# Patient Record
Sex: MALE | Race: BLACK OR AFRICAN AMERICAN | NOT HISPANIC OR LATINO | Employment: FULL TIME | URBAN - METROPOLITAN AREA
[De-identification: names, ages, dates, MRNs, and addresses within clinical notes are randomized per-mention and may not be internally consistent; named-entity substitution may affect disease eponyms.]

---

## 2024-07-02 ENCOUNTER — HOSPITAL ENCOUNTER (EMERGENCY)
Facility: CLINIC | Age: 44
Discharge: HOME | End: 2024-07-02
Attending: EMERGENCY MEDICINE | Admitting: EMERGENCY MEDICINE
Payer: COMMERCIAL

## 2024-07-02 VITALS
TEMPERATURE: 98.1 F | OXYGEN SATURATION: 94 % | RESPIRATION RATE: 17 BRPM | SYSTOLIC BLOOD PRESSURE: 163 MMHG | HEIGHT: 67 IN | HEART RATE: 79 BPM | DIASTOLIC BLOOD PRESSURE: 123 MMHG | BODY MASS INDEX: 29 KG/M2 | WEIGHT: 184.75 LBS

## 2024-07-02 DIAGNOSIS — I10 HYPERTENSION, UNSPECIFIED TYPE: ICD-10-CM

## 2024-07-02 DIAGNOSIS — D58.2 ELEVATED HEMOGLOBIN (H): ICD-10-CM

## 2024-07-02 LAB
ANION GAP SERPL CALCULATED.3IONS-SCNC: 12 MMOL/L (ref 7–15)
ATRIAL RATE - MUSE: 77 BPM
BASOPHILS # BLD AUTO: 0.1 10E3/UL (ref 0–0.2)
BASOPHILS NFR BLD AUTO: 1 %
BUN SERPL-MCNC: 14.9 MG/DL (ref 6–20)
CALCIUM SERPL-MCNC: 9.8 MG/DL (ref 8.6–10)
CHLORIDE SERPL-SCNC: 105 MMOL/L (ref 98–107)
CREAT SERPL-MCNC: 1.17 MG/DL (ref 0.67–1.17)
DEPRECATED HCO3 PLAS-SCNC: 21 MMOL/L (ref 22–29)
DIASTOLIC BLOOD PRESSURE - MUSE: NORMAL MMHG
EGFRCR SERPLBLD CKD-EPI 2021: 78 ML/MIN/1.73M2
EOSINOPHIL # BLD AUTO: 0.1 10E3/UL (ref 0–0.7)
EOSINOPHIL NFR BLD AUTO: 2 %
ERYTHROCYTE [DISTWIDTH] IN BLOOD BY AUTOMATED COUNT: 15 % (ref 10–15)
GLUCOSE SERPL-MCNC: 105 MG/DL (ref 70–99)
HCT VFR BLD AUTO: 60.7 % (ref 40–53)
HGB BLD-MCNC: 19.9 G/DL (ref 13.3–17.7)
HOLD SPECIMEN: NORMAL
HOLD SPECIMEN: NORMAL
IMM GRANULOCYTES # BLD: 0 10E3/UL
IMM GRANULOCYTES NFR BLD: 0 %
INTERPRETATION ECG - MUSE: NORMAL
LYMPHOCYTES # BLD AUTO: 1.6 10E3/UL (ref 0.8–5.3)
LYMPHOCYTES NFR BLD AUTO: 25 %
MCH RBC QN AUTO: 26.3 PG (ref 26.5–33)
MCHC RBC AUTO-ENTMCNC: 32.8 G/DL (ref 31.5–36.5)
MCV RBC AUTO: 80 FL (ref 78–100)
MONOCYTES # BLD AUTO: 0.5 10E3/UL (ref 0–1.3)
MONOCYTES NFR BLD AUTO: 8 %
NEUTROPHILS # BLD AUTO: 4 10E3/UL (ref 1.6–8.3)
NEUTROPHILS NFR BLD AUTO: 63 %
NRBC # BLD AUTO: 0 10E3/UL
NRBC BLD AUTO-RTO: 0 /100
P AXIS - MUSE: 60 DEGREES
PLATELET # BLD AUTO: 221 10E3/UL (ref 150–450)
POTASSIUM SERPL-SCNC: 3.9 MMOL/L (ref 3.4–5.3)
PR INTERVAL - MUSE: 166 MS
QRS DURATION - MUSE: 82 MS
QT - MUSE: 388 MS
QTC - MUSE: 439 MS
R AXIS - MUSE: 61 DEGREES
RBC # BLD AUTO: 7.56 10E6/UL (ref 4.4–5.9)
SODIUM SERPL-SCNC: 138 MMOL/L (ref 135–145)
SYSTOLIC BLOOD PRESSURE - MUSE: NORMAL MMHG
T AXIS - MUSE: 84 DEGREES
TROPONIN T SERPL HS-MCNC: 12 NG/L
VENTRICULAR RATE- MUSE: 77 BPM
WBC # BLD AUTO: 6.4 10E3/UL (ref 4–11)

## 2024-07-02 PROCEDURE — 80048 BASIC METABOLIC PNL TOTAL CA: CPT | Performed by: EMERGENCY MEDICINE

## 2024-07-02 PROCEDURE — 85004 AUTOMATED DIFF WBC COUNT: CPT | Performed by: EMERGENCY MEDICINE

## 2024-07-02 PROCEDURE — 36415 COLL VENOUS BLD VENIPUNCTURE: CPT | Performed by: EMERGENCY MEDICINE

## 2024-07-02 PROCEDURE — 99285 EMERGENCY DEPT VISIT HI MDM: CPT

## 2024-07-02 PROCEDURE — 93005 ELECTROCARDIOGRAM TRACING: CPT

## 2024-07-02 PROCEDURE — 84484 ASSAY OF TROPONIN QUANT: CPT | Performed by: EMERGENCY MEDICINE

## 2024-07-02 PROCEDURE — 250N000013 HC RX MED GY IP 250 OP 250 PS 637: Performed by: EMERGENCY MEDICINE

## 2024-07-02 RX ORDER — AMLODIPINE BESYLATE 5 MG/1
5 TABLET ORAL ONCE
Status: COMPLETED | OUTPATIENT
Start: 2024-07-02 | End: 2024-07-02

## 2024-07-02 RX ADMIN — AMLODIPINE BESYLATE 5 MG: 5 TABLET ORAL at 12:31

## 2024-07-02 ASSESSMENT — COLUMBIA-SUICIDE SEVERITY RATING SCALE - C-SSRS
2. HAVE YOU ACTUALLY HAD ANY THOUGHTS OF KILLING YOURSELF IN THE PAST MONTH?: NO
1. IN THE PAST MONTH, HAVE YOU WISHED YOU WERE DEAD OR WISHED YOU COULD GO TO SLEEP AND NOT WAKE UP?: NO
6. HAVE YOU EVER DONE ANYTHING, STARTED TO DO ANYTHING, OR PREPARED TO DO ANYTHING TO END YOUR LIFE?: NO

## 2024-07-02 ASSESSMENT — ACTIVITIES OF DAILY LIVING (ADL)
ADLS_ACUITY_SCORE: 35
ADLS_ACUITY_SCORE: 35

## 2024-07-02 NOTE — DISCHARGE INSTRUCTIONS
Please continue with your amlodipine as previously prescribed.  I would recommend keeping a log of your blood pressures at home.    Follow-up will be very important with regards to your blood pressure as well as further evaluation of your elevated red blood cell counts.    Please return to the ER if you develop any new or troubling symptoms such as severe headache, chest pain, vision changes or any other concerns.    Discharge Instructions  Hypertension - High Blood Pressure    During you visit to the Emergency Department, your blood pressure was higher than the recommended blood pressure.  This may be related to stress, pain, medication or other temporary conditions. In these cases, your blood pressure may return to normal on its own. If you have a history of high blood pressure, you may need to have your provider adjust your medications. Sometimes, your high measurement here may indicate that you have developed high blood pressure that will stay high unless it is treated. As a general rule, high blood pressure causes problems over years rather than days, weeks, or months. So, while it is important to treat blood pressure, it is rarely important to treat blood pressure immediately. Occasionally we will begin a medication in the Emergency Department; more often we will recommend close follow-up for medications with a primary doctor/clinic.    Generally, every Emergency Department visit should have a follow-up clinic visit with either a primary or a specialty clinic/provider. Please follow-up as instructed by your emergency provider today.    Return to the Emergency Department if you start to have:  A severe headache.  Chest pain.  Shortness of breath.  Weakness or numbness that affects one part of the body.  Confusion.  Vision changes.  Significant swelling of legs and/or eyes.  A reaction to any medication started in the Emergency Department.    What can I do to help myself?  Avoid alcohol.  Take any blood pressure  medicine that you are prescribed.  Get a good night s sleep.  Lower your salt intake.  Exercise.  Lose weight.  Manage stress.  See your doctor regularly    If blood pressure medication was started in the Emergency Department:  The medicine may not have an immediate effect. The body and brain determine what blood pressure you have. The medicine s job is to retrain the body s  thermostat  to a lower blood pressure.  You will need to follow up with your provider to see how this medicine is working for you.  If you were given a prescription for medicine here today, be sure to read all of the information (including the package insert) that comes with your prescription.  This will include important information about the medicine, its side effects, and any warnings that you need to know about.  The pharmacist who fills the prescription can provide more information and answer questions you may have about the medicine.  If you have questions or concerns that the pharmacist cannot address, please call or return to the Emergency Department.   Remember that you can always come back to the Emergency Department if you are not able to see your regular provider in the amount of time listed above, if you get any new symptoms, or if there is anything that worries you.

## 2024-07-02 NOTE — LETTER
July 2, 2024      To Whom It May Concern:      Jadiel Boss was seen in our Emergency Department today, 07/02/24.  I expect his condition to improve over the next day.  He may return to work when improved.    Sincerely,        Dayan RIVAS RN

## 2024-07-02 NOTE — ED NOTES
Patient's doctor from his physical yesterday called and pt. Put him on speaker with me in the room. He called to let him know his hemoglobin and RBC counts were high (hgb: 20.1, RBC: 7.56). Numbers are in line with what we resulted here. Doctor also said pt. Will be a  and needs Hep B vaccine or booster.

## 2024-07-02 NOTE — ED TRIAGE NOTES
Pt had physical yesterday for a job and was dx w/ htn. Pt states he then went to  yesterday where they prescribed bp meds. Pt took first dose last night. Pt concerned for waking up today again w/ htn. RN educated pt on bp meds. No cp. Pt complains of HA.

## 2024-07-02 NOTE — ED PROVIDER NOTES
Emergency Department Note      History of Present Illness     Chief Complaint   Hypertension      HPI   Jadiel Boss is a 44 year old male who presents with a complaint of hypertension.  Patient works as a , and was undergoing a physical for work in preparation for a possible move back to [place].  While there, he was noted to have an elevated blood pressure with systolic blood pressure in the 160s.  He was unable to have his physical completed, and had laboratory studies obtained.  He was referred to urgent care for further evaluation.  He was seen yesterday evening at the emergency room, where he underwent laboratory evaluation as well as EKG.  He was started on amlodipine.  He presents to the ER today as he awoke, and measured his systolic blood pressure at 180.  He notes a mild headache to the right side of his head, though states this headache feels similar to headaches he has had multiple times in the past and otherwise is not different.  He denies any vision changes including vision loss or double vision.  He denies any other neurologic symptoms including facial droop, extremity numbness/weakness, or speech disturbance.  Additionally, he denies any symptoms of chest pain, chest pressure nor shortness of breath, and works out without any exercise limitations.  He took 1 dose of amlodipine yesterday, though has not yet taken any medication today.  He denies any prior diagnosis of hypertension.  He notes a family history of diabetes.  On further questioning, the patient does report having been told by a physician years ago that he may need to have blood removed, though he was not quite sure why this was the case.  Patient is also currently on amoxicillin for a nose infection.    Independent Historian   None    Review of External Notes   I reviewed the [facility] note from yesterday.   Past Medical History   Medical History and Problem List   None    Medications   Amlodipine  Amoxicillin  "    Surgical History   None  Physical Exam     Patient Vitals for the past 24 hrs:   BP Temp Temp src Pulse Resp SpO2 Height Weight   07/02/24 1332 (!) 163/123 -- -- 79 -- 94 % -- --   07/02/24 1317 (!) 164/120 -- -- 76 -- 98 % -- --   07/02/24 1314 (!) 166/128 -- -- 78 -- 98 % -- --   07/02/24 1259 (!) 157/124 -- -- 80 -- 99 % -- --   07/02/24 1244 (!) 153/117 -- -- 80 -- 99 % -- --   07/02/24 1231 (!) 159/120 -- -- -- -- -- -- --   07/02/24 1226 (!) 159/120 -- -- 80 -- 100 % -- --   07/02/24 1211 (!) 166/125 -- -- 89 -- 95 % -- --   07/02/24 1156 (!) 168/124 -- -- 78 -- 96 % -- --   07/02/24 1141 (!) 174/149 -- -- 75 -- 96 % -- --   07/02/24 1126 (!) 168/128 -- -- 81 -- 99 % -- --   07/02/24 1122 (!) 178/131 -- -- 88 -- 99 % -- --   07/02/24 1104 (!) 177/151 98.1  F (36.7  C) Temporal 88 17 98 % 1.702 m (5' 7\") 83.8 kg (184 lb 11.9 oz)     Physical Exam    General:              Well-nourished              Speaking in full sentences  Eyes:              Conjunctiva without injection or scleral icterus  ENT:              Moist mucous membranes              Nares patent              Pinnae normal  Neck:              Full ROM              No stiffness appreciated  Resp:              Lungs CTAB              No crackles, wheezing or audible rubs              Good air movement  CV:                    Normal rate, regular rhythm              S1 and S2 present              No murmur, gallop or rub  GI:              BS present              Abdomen soft without distention              Non-tender to light and deep palpation              No guarding or rebound tenderness  Skin:              Warm, dry, well perfused              No rashes or open wounds on exposed skin  MSK:              Moves all extremities              No focal deformities or swelling  Neuro:              Alert   CN III-XII intact   Intact strength and sensation to upper and lower extremities              Answers questions appropriately              Moves all " extremities equally              Gait stable  Psych:              Normal affect, normal mood       Diagnostics     Lab Results   Labs Ordered and Resulted from Time of ED Arrival to Time of ED Departure   BASIC METABOLIC PANEL - Abnormal       Result Value    Sodium 138      Potassium 3.9      Chloride 105      Carbon Dioxide (CO2) 21 (*)     Anion Gap 12      Urea Nitrogen 14.9      Creatinine 1.17      GFR Estimate 78      Calcium 9.8      Glucose 105 (*)    CBC WITH PLATELETS AND DIFFERENTIAL - Abnormal    WBC Count 6.4      RBC Count 7.56 (*)     Hemoglobin 19.9 (*)     Hematocrit 60.7 (*)     MCV 80      MCH 26.3 (*)     MCHC 32.8      RDW 15.0      Platelet Count 221      % Neutrophils 63      % Lymphocytes 25      % Monocytes 8      % Eosinophils 2      % Basophils 1      % Immature Granulocytes 0      NRBCs per 100 WBC 0      Absolute Neutrophils 4.0      Absolute Lymphocytes 1.6      Absolute Monocytes 0.5      Absolute Eosinophils 0.1      Absolute Basophils 0.1      Absolute Immature Granulocytes 0.0      Absolute NRBCs 0.0     TROPONIN T, HIGH SENSITIVITY - Normal    Troponin T, High Sensitivity 12         Imaging   None    EKG   ECG results from 07/02/24   EKG 12 lead     Value    Systolic Blood Pressure     Diastolic Blood Pressure     Ventricular Rate 77    Atrial Rate 77    ND Interval 166    QRS Duration 82        QTc 439    P Axis 60    R AXIS 61    T Axis 84    Interpretation ECG      Sinus rhythm  Possible Left atrial enlargement  No changes noted compared to 7/1/24  Read by Dr Merlos, 11:18        Independent Interpretation   None    ED Course      Medications Administered   Medications   amLODIPine (NORVASC) tablet 5 mg (5 mg Oral $Given 7/2/24 1231)       Procedures   None    Discussion of Management   None    ED Course   ED Course as of 07/02/24 1931 Tue Jul 02, 2024   1119 I initially assessed the patient and obtained the history and physical exam.    1306 I rechecked and updated the  patient. I believe it is safe to discharge the patient. The patient agrees.    1329 I spoke to [facility] regarding a follow up appointment for the patient.       Optional/Additional Documentation  None    Medical Decision Making / Diagnosis     CMS Diagnoses: None    MIPS       None    [facility]   Jadiel Boss is a 44 year old male presenting to the emergency department for evaluation of hypertension.  VS on presentation reveal elevated BP, though otherwise are unremarkable.  History, exam, and ED course as outlined above.  At this time, I appreciate no evidence of acute endorgan dysfunction.  Although he does acknowledge a mild headache, he states this headache is consistent and typical with his regular headaches.  He denies any associated neurologic symptoms including facial droop, speech disturbance, extremity numbness/weakness, nor gait and balance.  Additionally, he denies any vision changes including vision loss or double vision.  He denies any cardiopulmonary symptoms including chest pain, chest pressure nor shortness of breath.  EKG demonstrates sinus rhythm, low when compared to EKG obtained from clinic yesterday, no acute changes.  His high-sensitivity troponin has also returned within normal limits for which I feel this is unlikely represent acute cardiac ischemia.  Renal function also baseline.  Patient was provided a dose of oral amlodipine here in the ER, with improvements in blood pressure.  He remains asymptomatic.  We discussed the need for continuation of amlodipine, as well as close follow-up with primary care provider for ongoing monitoring of his blood pressure and to determine if further medication interventions or dose adjustments are required.  The referral was placed through EMR to assist with outpatient follow-up.  We also discussed at length his elevated hemoglobin.  This was noted on yesterday's labs as well, and during his ED stay, he received a phone call from his employers clinic, also  alerting him of the elevated hemoglobin.  On further questioning, he acknowledges having been told of need for possible removal of blood years ago for which I am also suspicious this may have been noted previously.  Given concern for possible polycythemia or other underlying etiologies I have recommended follow-up with hematology.  Referral information was provided to the patient to assist with follow-up and I also have placed a phone call to the after-hours [facility] line to assist with follow-up.  At this time, with reasonable clinical certainty, in the absence of symptoms I feel patient can safely be discharged from the ER with close outpatient follow-up.  Patient feels comfortable with this plan of care.  He is to return to the ER should he develop any new or troubling symptoms such as severely elevated blood pressure, chest pain, headache, focal neurologic symptoms or any other concerns.  I stressed the importance of following up prior to returning home to [place] which patient is understanding of.  All questions answered prior to discharge.    Disposition   The patient was discharged.     Diagnosis     ICD-10-CM    1. Hypertension, unspecified type  I10 Primary Care Referral      2. Elevated hemoglobin (H24)  D58.2 Primary Care Referral           Discharge Medications   None    Scribe Disclosure:  I, Sherryayanna Buchanan, am serving as a scribe at 11:17 AM on 7/2/2024 to document services personally performed by Gagandeep Merlos MD based on my observations and the provider's statements to me.        Gagandeep Merlos MD  07/02/24 1934

## 2024-07-28 ENCOUNTER — HEALTH MAINTENANCE LETTER (OUTPATIENT)
Age: 44
End: 2024-07-28

## 2025-08-10 ENCOUNTER — HEALTH MAINTENANCE LETTER (OUTPATIENT)
Age: 45
End: 2025-08-10